# Patient Record
Sex: FEMALE | Race: BLACK OR AFRICAN AMERICAN
[De-identification: names, ages, dates, MRNs, and addresses within clinical notes are randomized per-mention and may not be internally consistent; named-entity substitution may affect disease eponyms.]

---

## 2018-01-31 NOTE — ER DOCUMENT REPORT
HPI





- HPI


Patient complains to provider of: Low back pain


Onset: This morning


Onset/Duration: Sudden


Quality of pain: Sharp


Pain Level: 4


Context: 





Patient states she was attempting to lift a family member who she is taking 

care of at home and had a sudden onset of low back pain that radiates down her 

right lower extremity.  Patient denies any paresthesia, fever, urinary 

retention or incontinence.  Patient denies any history of IV drug use.


Associated Symptoms: Other - Low back pain.  denies: Fever, Headache


Exacerbated by: Movement


Relieved by: Denies


Similar symptoms previously: No


Recently seen / treated by doctor: No





- ROS


ROS below otherwise negative: Yes


Systems Reviewed and Negative: Yes All other systems reviewed and negative





- CONSTITUTIONAL


Constitutional: DENIES: Fever, Chills





- NEURO


Neurology: DENIES: Headache, Weakness





- GASTROINTESTINAL


Gastrointestinal: DENIES: Nausea





- URINARY


Urinary: DENIES: Dysuria, Urgency, Frequency





- MUSCULOSKELETAL


Musculoskeletal: REPORTS: Extremity pain, Back Pain.  DENIES: Neck Pain





- DERM


Skin Color: Normal


Skin Problems: None





Past Medical History





- General


Information source: Patient





- Social History


Smoking Status: Current Every Day Smoker


Smoking Education Provided: Yes


Frequency of alcohol use: None


Drug Abuse: None


Occupation: None


Lives with: Family


Family History: Reviewed & Not Pertinent





- Past Medical History


Cardiac Medical History: Reports: Hx Hypertension


Past Surgical History: Reports: Hx Cholecystectomy, Hx Orthopedic Surgery, Hx 

Tubal Ligation





Vertical Provider Document





- CONSTITUTIONAL


Agree With Documented VS: Yes


Exam Limitations: No Limitations


General Appearance: WD/WN, No Apparent Distress


Notes: 





PHYSICAL EXAMINATION:





GENERAL: Well-appearing, well-nourished and in no acute distress.





HEAD: Atraumatic, normocephalic.





EYES: sclera clear, anicteric, conjunctiva are normal.





ENT: nares patent, Moist mucous membranes.





NECK: Normal range of motion, supple no lymphadenopathy





LUNGS: respirations unlabored





HEART: Regular rate and rhythm without murmurs 





EXTREMITIES: Normal range of motion, no pitting or edema.  No cyanosis. Gait 

normal, pt ambulates without difficulty





BACK: Right lower lumbar paraspinal tenderness, no midline tenderness, no 

deformities or step-offs.  No CVA tenderness. 





NEUROLOGICAL: Cranial nerves grossly intact.  Normal speech, normal gait.  No 

saddle anesthesia.  No foot drop





PSYCH: Normal mood, normal affect.





SKIN: Warm, Dry, normal turgor, no rashes or lesions noted.











- INFECTION CONTROL


TRAVEL OUTSIDE OF THE [place] IN LAST 30 DAYS: No





- RESPIRATORY


O2 Sat by Pulse Oximetry: 98





Course





- Re-evaluation


Re-evalutation: 





01/31/18 15:09


Patient with known history of hypertension states that she missed her 12:00 

dose of her metoprolol.  Patient states that she takes metoprolol 3 times a day 

25 mg and hydralazine twice a day.  Patient denies any headache, chest pain, 

visual problems dyspnea or urinary symptoms.  Patient with low back pain that 

appears to be musculoskeletal in nature.  The patient presents with low back 

pain without signs of spinal cord compression, cauda equina syndrome, infection

, aneurysm, or other serious etiology.  The patient is neurologically intact.  

Given the extremely risk of these diagnoses further testing and evaluation for 

these possibilities does not appear to be indicated at this time.  Patient has 

been instructed to return if the symptoms worsen or change in any way.





- Vital Signs


Vital signs: 


 











Temp Pulse Resp BP Pulse Ox


 


 98.2 F   80   17   183/124 H  98 


 


 01/31/18 14:06  01/31/18 14:06  01/31/18 14:06  01/31/18 14:06  01/31/18 14:06














Discharge





- Discharge


Clinical Impression: 


 Hx of essential hypertension





Sciatica


Qualifiers:


 Laterality: right Qualified Code(s): M54.31 - Sciatica, right side





Condition: Stable


Disposition: HOME, SELF-CARE


Instructions:  High Blood Pressure, Requiring Treatment ([facility]), Ice Packs ([facility]), 

Low Back Pain ([facility]), Sciatica ([facility])


Additional Instructions: 


Return immediately for any new or worsening symptoms





Followup with your primary care provider, call tomorrow to make a followup 

appointment





Take your evening dose of blood pressure medication at home tonight as 

directed.  





No heavy lifting


Prescriptions: 


Oxycodone HCl/Acetaminophen [Percocet 5-325 mg Tablet] 1 tab PO ASDIR PRN #15 

tablet


 PRN Reason: 


Referrals: 


HARDY GRACE PA-C [NO LOCAL MD] - Follow up tomorrow

## 2018-06-26 NOTE — ER DOCUMENT REPORT
HPI





- HPI


Patient complains to provider of: Leg pain


Onset: Other - 4 days


Onset/Duration: Persistent


Quality of pain: Achy


Pain Level: 4


Context: 





Patient presents complaining of bilateral thigh muscle tenderness for the past 

4 days.  Patient states that her pain was precipitated by multiple deer fly 

insect bites to her extremities 5 days ago.  Patient states 4 days ago she had 

a fever although has not had a fever since then.  Patient does complain of some 

congestion symptoms.  Patient denies any injury.  Patient denies any changes in 

her usual chronic medications.  Patient states the pain is affecting her 

ability to sleep at night.


Associated Symptoms: Body/muscle aches, Other - Bilateral thigh muscle 

tenderness.  denies: Chest pain, Fever, Headache, Vomiting


Exacerbated by: Movement


Relieved by: Denies


Similar symptoms previously: No


Recently seen / treated by doctor: No





- ROS


ROS below otherwise negative: Yes


Systems Reviewed and Negative: Yes All other systems reviewed and negative





- CONSTITUTIONAL


Constitutional: DENIES: Fever, Chills





- EENT


EENT: REPORTS: Congestion





- NEURO


Neurology: DENIES: Headache, Weakness, Vision blurred, Dizzinesss / Vertigo





- CARDIOVASCULAR


Cardiovascular: DENIES: Chest pain





- RESPIRATORY


Respiratory: DENIES: Trouble Breathing, Coughing





- GASTROINTESTINAL


Gastrointestinal: DENIES: Abdominal Pain, Nausea, Black / Bloody Stools





- URINARY


Urinary: DENIES: Dysuria, Urgency, Frequency





- MUSCULOSKELETAL


Musculoskeletal: REPORTS: Extremity pain - Both Legs.  DENIES: Back Pain, Neck 

Pain, Swelling





- DERM


Notes: 





Insect bites to bilateral lower extremities





Past Medical History





- General


Information source: Patient





- Social History


Smoking Status: Never Smoker


Frequency of alcohol use: None


Drug Abuse: Marijuana


Occupation: None


Lives with: Family


Family History: Reviewed & Not Pertinent


Patient has suicidal ideation: No


Patient has homicidal ideation: No





- Past Medical History


Cardiac Medical History: Reports: Hx Hypertension


Renal/ Medical History: Denies: Hx Peritoneal Dialysis


Psychiatric Medical History: Reports: Hx Bipolar Disorder, Hx Schizophrenia


Past Surgical History: Reports: Hx Cholecystectomy, Hx Orthopedic Surgery, Hx 

Tubal Ligation





Vertical Provider Document





- CONSTITUTIONAL


Agree With Documented VS: Yes


Exam Limitations: No Limitations


General Appearance: WD/WN, No Apparent Distress





- INFECTION CONTROL


TRAVEL OUTSIDE OF THE [place] IN LAST 30 DAYS: No





- HEENT


HEENT: Atraumatic, Normocephalic





- NECK


Neck: Normal Inspection, Supple





- RESPIRATORY


Respiratory: Breath Sounds Normal, No Respiratory Distress





- CARDIOVASCULAR


Cardiovascular: Regular Rate, Regular Rhythm


Pulses: Normal: Dorsalis pedis





- BACK


Back: Normal Inspection.  negative: CVA Tenderness-Right, CVA Tenderness-Left


Notes: 





No spinal tenderness





- MUSCULOSKELETAL/EXTREMETIES


Musculoskeletal/Extremeties: MAEW, FROM, Tender - Bilateral thigh tenderness, 

No Edema.  negative: Eccymosis





- NEURO


Level of Consciousness: Awake, Alert, Appropriate


Motor/Sensory: No Motor Deficit, No Sensory Deficit





- DERM


Integumentary: Warm, Dry.  negative: Abscess


Notes: 





Patient with scattered erythematous lesions to bilateral lower extremities 

consistent with reported history of insect bites





Course





- Re-evaluation


Re-evalutation: 





06/26/18 23:11


Patient with bilateral thigh muscle tenderness, normal skin color and 

temperature to bilateral extremities.  No concern for cellulitis.  No concern 

for DVT given bilateral symptoms.  No swelling.  Patient does report recent 

fever and upper respiratory symptoms.  Suspect that patient likely has viral 

illness with myalgia.


06/26/18 23:13








- Vital Signs


Vital signs: 


 











Temp Pulse Resp BP Pulse Ox


 


 98.1 F   77   16   176/112 H  98 


 


 06/26/18 19:12  06/26/18 19:12  06/26/18 19:12  06/26/18 19:12  06/26/18 19:12














- Laboratory


Result Diagrams: 


 06/26/18 21:47





 06/26/18 21:47


Laboratory results interpreted by me: 





06/26/18 23:11


 Labs- Entire Visit











  06/26/18 06/26/18





  21:47 21:47


 


WBC  7.1 


 


RBC  3.95 


 


Hgb  12.8 


 


Hct  37.5 


 


MCV  95 


 


MCH  32.5 


 


MCHC  34.2 


 


RDW  13.5 


 


Plt Count  222 


 


Total Counted  100 


 


Seg Neutrophils %  Not Reportable 


 


Seg Neuts % (Manual)  34 L 


 


Lymphocytes %  Not Reportable 


 


Lymphocytes % (Manual)  46 H 


 


Atypical Lymphs %  3 


 


Monocytes %  Not Reportable 


 


Monocytes % (Manual)  7 


 


Eosinophils %  Not Reportable 


 


Eosinophils % (Manual)  10 H 


 


Basophils %  Not Reportable 


 


Basophils % (Manual)  0 


 


Absolute Neutrophils  Not Reportable 


 


Abs Neuts (Manual)  2.4 


 


Absolute Lymphocytes  Not Reportable 


 


Abs Lymphs (Manual)  3.5 


 


Absolute Monocytes  Not Reportable 


 


Abs Monocytes (Manual)  0.5 


 


Absolute Eosinophils  Not Reportable 


 


Absolute Eos (Manual)  0.7 H 


 


Absolute Basophils  Not Reportable 


 


Abs Basophils (Manual)  0.0 


 


Toxic Granulation  SLIGHT 


 


Platelet Comment  ADEQUATE 


 


Sodium   145.7 H


 


Potassium   3.7


 


Chloride   107


 


Carbon Dioxide   30


 


Anion Gap   9


 


BUN   21 H


 


Creatinine   0.85


 


Est GFR ( Amer)   > 60


 


Est GFR (Non-Af Amer)   > 60


 


Glucose   91


 


Calcium   9.3


 


Creatine Kinase   124














Discharge





- Discharge


Clinical Impression: 


 Myalgia





Insect bite


Qualifiers:


 Encounter type: initial encounter Qualified Code(s): W57.XXXA - Bitten or 

stung by nonvenomous insect and other nonvenomous arthropods, initial encounter





Condition: Stable


Disposition: HOME, SELF-CARE


Instructions:  Insect Bites ([facility]), Myalagia (Muscle Pain) ([facility]), Topical 

Steroid Cream or Ointment ([facility])


Additional Instructions: 


Return immediately for any new or worsening symptoms





Followup with your primary care provider, call tomorrow to make a followup 

appointment








Prescriptions: 


Oxycodone HCl/Acetaminophen [Percocet 5-325 mg Tablet] 1 tab PO ASDIR PRN #10 

tablet


 PRN Reason: 


Triamcinolone Acetonide [Aristocort 0.1% Cream] 1 applic TP TID #60 gm


Referrals: 


HARDY GRACE PA-C [Primary Care Provider] - Follow up tomorrow

## 2018-07-29 ENCOUNTER — HOSPITAL ENCOUNTER (EMERGENCY)
Dept: HOSPITAL 62 - ER | Age: 48
LOS: 1 days | Discharge: HOME | End: 2018-07-30
Payer: MEDICAID

## 2018-07-29 ENCOUNTER — HOSPITAL ENCOUNTER (EMERGENCY)
Dept: HOSPITAL 62 - ER | Age: 48
Discharge: PSYCHIATRIC OR SUBSTANCE USE FACILITY | End: 2018-07-29
Payer: MEDICAID

## 2018-07-29 VITALS — DIASTOLIC BLOOD PRESSURE: 99 MMHG | SYSTOLIC BLOOD PRESSURE: 148 MMHG

## 2018-07-29 DIAGNOSIS — Z88.0: ICD-10-CM

## 2018-07-29 DIAGNOSIS — I10: ICD-10-CM

## 2018-07-29 DIAGNOSIS — F41.9: ICD-10-CM

## 2018-07-29 DIAGNOSIS — R45.851: ICD-10-CM

## 2018-07-29 DIAGNOSIS — Z91.018: ICD-10-CM

## 2018-07-29 DIAGNOSIS — F14.10: Primary | ICD-10-CM

## 2018-07-29 LAB
ADD MANUAL DIFF: NO
ALBUMIN SERPL-MCNC: 4.3 G/DL (ref 3.5–5)
ALP SERPL-CCNC: 74 U/L (ref 38–126)
ALT SERPL-CCNC: 26 U/L (ref 9–52)
ANION GAP SERPL CALC-SCNC: 13 MMOL/L (ref 5–19)
APAP SERPL-MCNC: < 10 UG/ML (ref 10–30)
APPEARANCE UR: CLEAR
APTT PPP: YELLOW S
AST SERPL-CCNC: 23 U/L (ref 14–36)
BARBITURATES UR QL SCN: NEGATIVE
BASOPHILS # BLD AUTO: 0.1 10^3/UL (ref 0–0.2)
BASOPHILS NFR BLD AUTO: 0.9 % (ref 0–2)
BILIRUB DIRECT SERPL-MCNC: 0.2 MG/DL (ref 0–0.4)
BILIRUB SERPL-MCNC: 0.5 MG/DL (ref 0.2–1.3)
BILIRUB UR QL STRIP: NEGATIVE
BUN SERPL-MCNC: 21 MG/DL (ref 7–20)
CALCIUM: 9.6 MG/DL (ref 8.4–10.2)
CHLORIDE SERPL-SCNC: 110 MMOL/L (ref 98–107)
CO2 SERPL-SCNC: 25 MMOL/L (ref 22–30)
EOSINOPHIL # BLD AUTO: 0.3 10^3/UL (ref 0–0.6)
EOSINOPHIL NFR BLD AUTO: 3.8 % (ref 0–6)
ERYTHROCYTE [DISTWIDTH] IN BLOOD BY AUTOMATED COUNT: 14 % (ref 11.5–14)
ETHANOL SERPL-MCNC: < 10 MG/DL
GLUCOSE SERPL-MCNC: 91 MG/DL (ref 75–110)
GLUCOSE UR STRIP-MCNC: NEGATIVE MG/DL
HCT VFR BLD CALC: 38.6 % (ref 36–47)
HGB BLD-MCNC: 13.2 G/DL (ref 12–15.5)
KETONES UR STRIP-MCNC: 20 MG/DL
LYMPHOCYTES # BLD AUTO: 3.5 10^3/UL (ref 0.5–4.7)
LYMPHOCYTES NFR BLD AUTO: 42.5 % (ref 13–45)
MCH RBC QN AUTO: 32.6 PG (ref 27–33.4)
MCHC RBC AUTO-ENTMCNC: 34.3 G/DL (ref 32–36)
MCV RBC AUTO: 95 FL (ref 80–97)
METHADONE UR QL SCN: NEGATIVE
MONOCYTES # BLD AUTO: 0.7 10^3/UL (ref 0.1–1.4)
MONOCYTES NFR BLD AUTO: 8.7 % (ref 3–13)
NEUTROPHILS # BLD AUTO: 3.6 10^3/UL (ref 1.7–8.2)
NEUTS SEG NFR BLD AUTO: 44.1 % (ref 42–78)
NITRITE UR QL STRIP: NEGATIVE
PCP UR QL SCN: NEGATIVE
PH UR STRIP: 5 [PH] (ref 5–9)
PLATELET # BLD: 264 10^3/UL (ref 150–450)
POTASSIUM SERPL-SCNC: 3.9 MMOL/L (ref 3.6–5)
PROT SERPL-MCNC: 7.7 G/DL (ref 6.3–8.2)
PROT UR STRIP-MCNC: NEGATIVE MG/DL
RBC # BLD AUTO: 4.07 10^6/UL (ref 3.72–5.28)
SALICYLATES SERPL-MCNC: < 1 MG/DL (ref 2–20)
SODIUM SERPL-SCNC: 148.1 MMOL/L (ref 137–145)
SP GR UR STRIP: 1.03
TOTAL CELLS COUNTED % (AUTO): 100 %
URINE AMPHETAMINES SCREEN: NEGATIVE
URINE BENZODIAZEPINES SCREEN: NEGATIVE
URINE COCAINE SCREEN: (no result)
URINE MARIJUANA (THC) SCREEN: (no result)
UROBILINOGEN UR-MCNC: 2 MG/DL (ref ?–2)
WBC # BLD AUTO: 8.2 10^3/UL (ref 4–10.5)

## 2018-07-29 PROCEDURE — 93010 ELECTROCARDIOGRAM REPORT: CPT

## 2018-07-29 PROCEDURE — 99284 EMERGENCY DEPT VISIT MOD MDM: CPT

## 2018-07-29 PROCEDURE — 36415 COLL VENOUS BLD VENIPUNCTURE: CPT

## 2018-07-29 PROCEDURE — 80307 DRUG TEST PRSMV CHEM ANLYZR: CPT

## 2018-07-29 PROCEDURE — 81001 URINALYSIS AUTO W/SCOPE: CPT

## 2018-07-29 PROCEDURE — 84703 CHORIONIC GONADOTROPIN ASSAY: CPT

## 2018-07-29 PROCEDURE — 80053 COMPREHEN METABOLIC PANEL: CPT

## 2018-07-29 PROCEDURE — 99285 EMERGENCY DEPT VISIT HI MDM: CPT

## 2018-07-29 PROCEDURE — 85025 COMPLETE CBC W/AUTO DIFF WBC: CPT

## 2018-07-29 PROCEDURE — 93005 ELECTROCARDIOGRAM TRACING: CPT
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
## 2018-07-29 NOTE — EKG REPORT
SEVERITY:- BORDERLINE ECG -

SINUS RHYTHM

BORDERLINE T ABNORMALITIES, ANT-LAT LEADS

BORDERLINE PROLONGED QT INTERVAL

:

Confirmed by: Viktoriya Marino MD 29-Jul-2018 18:29:06

## 2018-07-29 NOTE — ER DOCUMENT REPORT
ED General





- General


Stated Complaint: PSYCH EVAL


Time Seen by Provider: 07/29/18 15:43


Mode of Arrival: Medic


Information source: Patient, Emergency Med Personnel


Notes: 





48-year-old female who is a recovering addict presents after using cocaine.  

Patient was noted to go to  D after threatening to harm herself.  Patient 

states she wishes to have help for her addiction


TRAVEL OUTSIDE OF THE [place] IN LAST 30 DAYS: No





- HPI


Onset: Just prior to arrival


Onset/Duration: Sudden


Quality of pain: No pain


Severity: Moderate


Pain Level: Denies


Associated symptoms: Other


Exacerbated by: Denies


Relieved by: Denies


Similar symptoms previously: Yes


Recently seen / treated by doctor: No





- Related Data


Allergies/Adverse Reactions: 


 





mushroom Allergy (Verified 06/26/18 19:10)


 


Penicillins Allergy (Verified 06/26/18 19:10)


 











Past Medical History





- Social History


Smoking Status: Unknown if Ever Smoked


Cigarette use (# per day): No


Chew tobacco use (# tins/day): No


Smoking Education Provided: No


Drug Abuse: Cocaine


Lives with: Alone


Family History: Reviewed & Not Pertinent





- Past Medical History


Cardiac Medical History: Reports: Hx Hypertension


Renal/ Medical History: Denies: Hx Peritoneal Dialysis


Psychiatric Medical History: Reports: Hx Bipolar Disorder, Hx Schizophrenia


Past Surgical History: Reports: Hx Cholecystectomy, Hx Orthopedic Surgery, Hx 

Tubal Ligation





Review of Systems





- Review of Systems


Notes: 





REVIEW OF SYSTEMS:


CONSTITUTIONAL :  Denies fever,  chills, or sweats.  Denies recent illness.


EENT:   Denies eye, ear, throat, or mouth pain or symptoms.  Denies nasal or 

sinus congestion or discharge.  Denies throat, tongue, or mouth swelling or 

difficulty swallowing.


CARDIOVASCULAR:  Denies chest pain.  Denies palpitations or racing or irregular 

heart beat.  Denies ankle edema.


RESPIRATORY:  Denies cough, cold, or chest congestion.  Denies shortness of 

breath, difficulty breathing, or wheezing.


GASTROINTESTINAL:  Denies abdominal pain or distention.  Denies nausea, vomiting

, or diarrhea.  Denies blood in vomitus, stools, or per rectum.  Denies black, 

tarry stools.  Denies constipation. 


GENITOURINARY:  Denies difficulty urinating, painful urination, burning, 

frequency, blood in urine, or discharge.


FEMALE  GENITOURINARY:  Denies vaginal bleeding, heavy or abnormal periods, 

irregular periods.  Denies vaginal discharge or odor. 


MUSCULOSKELETAL:  Denies back or neck pain or stiffness.  Denies joint pain or 

swelling.


SKIN:   Denies rash, lesions or sores.


HEMATOLOGIC :   Denies easy bruising or bleeding.


LYMPHATIC:  Denies swollen, enlarged glands.


NEUROLOGICAL:  Denies confusion or altered mental status.  Denies passing out 

or loss of consciousness.  Denies dizziness or lightheadedness.  Denies 

headache.  Denies weakness or paralysis or loss of use of either side.  Denies 

problems with gait or speech.  Denies sensory loss, numbness, or tingling.  

Denies seizures.


PSYCHIATRIC: Admits to suicidal ideations





ALL OTHER SYSTEMS REVIEWED AND NEGATIVE.











PHYSICAL EXAMINATION:





GENERAL: Well-appearing, well-nourished and in no acute distress.





HEAD: Atraumatic, normocephalic.





EYES: Pupils equal round and reactive to light, extraocular movements intact, 

conjunctiva are normal.





ENT: Nares patent, oropharynx clear without exudates.  Moist mucous membranes.





NECK: Normal range of motion, supple without lymphadenopathy





LUNGS: Breath sounds clear to auscultation bilaterally and equal.  No wheezes 

rales or rhonchi.





HEART: Regular rate and rhythm without murmurs





ABDOMEN: Soft, nontender, nondistended abdomen.  No guarding, no rebound.  No 

masses appreciated.





Female : deferred





Musculoskeletal: Normal range of motion, no pitting or edema.  No cyanosis.





NEUROLOGICAL: Cranial nerves grossly intact.  Normal speech, normal gait.  

Normal sensory, motor exams





PSYCH: Tearful anxious.





SKIN: Warm, Dry, normal turgor, no rashes or lesions noted.

















Dictation was performed using Dragon voice recognition software





Physical Exam





- Vital signs


Vitals: 


 











Temp Pulse Resp BP Pulse Ox


 


 98.1 F   90   20   154/111 H  100 


 


 07/29/18 15:46  07/29/18 15:46  07/29/18 15:46  07/29/18 15:46  07/29/18 15:46














Course





- Re-evaluation


Re-evalutation: 





07/29/18 15:50


Patient's presentation is most consistent with withdrawal symptoms, I will have 

mental health evaluate to determine appropriate care





- Vital Signs


Vital signs: 


 











Temp Pulse Resp BP Pulse Ox


 


 97.8 F   93   16   148/99 H  100 


 


 07/29/18 18:41  07/29/18 18:41  07/29/18 18:41  07/29/18 18:41  07/29/18 18:41














- Laboratory


Result Diagrams: 


 07/29/18 16:24





 07/29/18 16:24


Laboratory results interpreted by me: 


 











  07/29/18 07/29/18





  16:24 18:44


 


Sodium  148.1 H 


 


Chloride  110 H 


 


BUN  21 H 


 


Est GFR (Non-Af Amer)  52 L 


 


Urine Ketones   20 H


 


Urine Urobilinogen   2.0 H


 


Ur Leukocyte Esterase   MODERATE H


 


Salicylates  < 1.0 L 


 


Acetaminophen  < 10 L 














Discharge





- Discharge


Clinical Impression: 


 Cocaine abuse





Condition: Stable


Disposition: PSYCH HOSP/UNIT


Referrals: 


HARDY GRACE PA-C [Primary Care Provider] - Follow up as needed

## 2018-07-30 VITALS — SYSTOLIC BLOOD PRESSURE: 149 MMHG | DIASTOLIC BLOOD PRESSURE: 99 MMHG

## 2018-07-30 NOTE — ER DOCUMENT REPORT
ED General





- General


Chief Complaint: Suicidal Ideation


Stated Complaint: PSYCH EVAL IVC


Time Seen by Provider: 07/29/18 21:47


Mode of Arrival: Medic


TRAVEL OUTSIDE OF THE [place] IN LAST 30 DAYS: No





- HPI


Notes: 





Aberrant behavior, drug abuse, see previous ER progress note for further details

, see psychology notes as well.





- Related Data


Allergies/Adverse Reactions: 


 





mushroom Allergy (Verified 06/26/18 19:10)


 


Penicillins Allergy (Verified 06/26/18 19:10)


 











Past Medical History





- General


Information source: Patient, Emergency Med Personnel





- Social History


Smoking Status: Never Smoker


Cigarette use (# per day): No


Chew tobacco use (# tins/day): No


Drug Abuse: Cocaine


Family History: Reviewed & Not Pertinent


Patient has suicidal ideation: Yes


Patient has homicidal ideation: No





- Past Medical History


Cardiac Medical History: Reports: Hx Hypertension


Renal/ Medical History: Denies: Hx Peritoneal Dialysis


Psychiatric Medical History: Reports: Hx Bipolar Disorder, Hx Schizophrenia


Past Surgical History: Reports: Hx Cholecystectomy, Hx Orthopedic Surgery, Hx 

Tubal Ligation





Review of Systems





- Review of Systems


Notes: 





REVIEW OF SYSTEMS:


CONSTITUTIONAL: -fevers, -chills


EENT: -eye pain, -difficulty swallowing, -nasal congestion


CARDIOVASCULAR: -chest pain, -syncope.


RESPIRATORY: -cough, -SOB


GASTROINTESTINAL: -abdominal pain, -nausea, -vomiting, -diarrhea


GENITOURINARY: -dysuria, -hematuria


MUSCULOSKELETAL: -back pain, -neck pain


SKIN: -rash or skin lesions.


HEMATOLOGIC: -easy bruising or bleeding.


LYMPHATIC: -swollen, enlarged glands.


NEUROLOGICAL: -altered mental status or loss of consciousness, -headache, -

neurologic symptoms


PSYCHIATRIC: -anxiety, -depression.


ALL OTHER SYSTEMS REVIEWED AND NEGATIVE.





Physical Exam





- Vital signs


Vitals: 





 











Resp


 


 39 H


 


 07/29/18 21:44














- Notes


Notes: 





PHYSICAL EXAMINATION:


GENERAL: Well-appearing, well-nourished and in no acute distress.


HEAD: Atraumatic, normocephalic.


EYES: Pupils equal round and reactive to light, extraocular movements intact, 

sclera anicteric, conjunctiva are normal.


ENT: nares patent, oropharynx clear without exudates.  Moist mucous membranes.


NECK: Normal range of motion, supple without lymphadenopathy


LUNGS: Breath sounds clear to auscultation bilaterally and equal.  No wheezes 

rales or rhonchi.


HEART: Regular rate and rhythm without murmurs


ABDOMEN: Soft, nontender, normoactive bowel sounds.  No guarding, no rebound.  

No masses appreciated.


EXTREMITIES: Normal range of motion, no pitting or edema.  No cyanosis.


NEUROLOGICAL: Cranial nerves grossly intact.  Normal speech, normal gait.  

Normal sensory and motor exams.


PSYCH: Normal mood, normal affect.


SKIN: Warm, Dry, normal turgor, no rashes or lesions noted.





Course





- Re-evaluation


Re-evalutation: 





07/30/18 12:36


NINA NOW SOBER AND SAMUEL BE DISCHARGED TO [facility]


07/30/18 12:37








lEVEL BILLING dR. [place]





- Vital Signs


Vital signs: 





 











Temp Pulse Resp BP Pulse Ox


 


 98.5 F   84   18   145/76 H  100 


 


 07/30/18 10:31  07/30/18 10:31  07/30/18 10:31  07/30/18 10:31  07/30/18 10:31














Discharge





- Discharge


Clinical Impression: 


 Cocaine abuse, Drug abuse





Condition: Stable


Disposition: HOME, SELF-CARE


Additional Instructions: 


You have been evaluated by both medical and behavioral health team's and deemed 

appropriate for discharge.  It is recommended you follow-up with mobile crisis 

for continued assistance in detox placement; you have been provided resource 

lists which include both substance abuse and mental health providers for the 

local community.





AT ANY TIME, IF YOUR SYMPTOMS CHANGE SIGNIFICANTLY OR WORSEN OR YOU DEVELOP NEW 

SYMPTOMS, RETURN TO THE EMERGENCY DEPARTMENT IMMEDIATELY FOR RE-EVALUATION.








Referrals: 


[facility] [Outside] - Follow up as needed


[facility] [Outside] - Follow up as needed


HARDY GRACE PA-C [Primary Care Provider] - Follow up as needed